# Patient Record
Sex: FEMALE | NOT HISPANIC OR LATINO | URBAN - METROPOLITAN AREA
[De-identification: names, ages, dates, MRNs, and addresses within clinical notes are randomized per-mention and may not be internally consistent; named-entity substitution may affect disease eponyms.]

---

## 2023-07-24 DIAGNOSIS — Z00.00 ENCOUNTER FOR GENERAL ADULT MEDICAL EXAMINATION W/OUT ABNORMAL FINDINGS: ICD-10-CM

## 2023-07-25 NOTE — CONSULT LETTER
[Dear  ___] : Dear  [unfilled], [Consult Letter:] : I had the pleasure of evaluating your patient, [unfilled]. [Please see my note below.] : Please see my note below. [Consult Closing:] : Thank you very much for allowing me to participate in the care of this patient.  If you have any questions, please do not hesitate to contact me. [Sincerely,] : Sincerely, [FreeTextEntry3] : Dr. Esperanza Camargo  [DrRj  ___] : Dr. SMITH

## 2023-07-25 NOTE — RESULTS/DATA
[FreeTextEntry1] : 7/21/23 MRI Abd with MRCP: Findings most consistent with mass forming cholangiocarcinoma and satellite metastases. Periportal lymphadenopathy is concerning for lymph node metastases. \par \par 7/18/23 Abd US: Hepatomegaly and moderately large liver lesions possibly representing hemangiomas (largest 4.9 x 4.6 x 3.9 cm) ; request pre and postcontrast enhanced abdominal MRI for further characterization. Cholecystectomy with compensatory common duct ectasia.

## 2023-07-26 ENCOUNTER — OUTPATIENT (OUTPATIENT)
Dept: OUTPATIENT SERVICES | Facility: HOSPITAL | Age: 63
LOS: 1 days | Discharge: HOME | End: 2023-07-26

## 2023-07-26 ENCOUNTER — APPOINTMENT (OUTPATIENT)
Dept: SURGICAL ONCOLOGY | Facility: CLINIC | Age: 63
End: 2023-07-26
Payer: COMMERCIAL

## 2023-07-26 VITALS
SYSTOLIC BLOOD PRESSURE: 152 MMHG | HEART RATE: 71 BPM | TEMPERATURE: 98.4 F | WEIGHT: 168 LBS | DIASTOLIC BLOOD PRESSURE: 78 MMHG | OXYGEN SATURATION: 95 % | BODY MASS INDEX: 29.4 KG/M2 | HEIGHT: 63.5 IN

## 2023-07-26 DIAGNOSIS — Z87.891 PERSONAL HISTORY OF NICOTINE DEPENDENCE: ICD-10-CM

## 2023-07-26 DIAGNOSIS — C22.1 INTRAHEPATIC BILE DUCT CARCINOMA: ICD-10-CM

## 2023-07-26 DIAGNOSIS — Z78.9 OTHER SPECIFIED HEALTH STATUS: ICD-10-CM

## 2023-07-26 PROCEDURE — 99204 OFFICE O/P NEW MOD 45 MIN: CPT

## 2023-07-27 ENCOUNTER — RESULT REVIEW (OUTPATIENT)
Age: 63
End: 2023-07-27

## 2023-07-27 ENCOUNTER — NON-APPOINTMENT (OUTPATIENT)
Age: 63
End: 2023-07-27

## 2023-07-27 ENCOUNTER — APPOINTMENT (OUTPATIENT)
Dept: HEMATOLOGY ONCOLOGY | Facility: CLINIC | Age: 63
End: 2023-07-27
Payer: COMMERCIAL

## 2023-07-27 ENCOUNTER — OUTPATIENT (OUTPATIENT)
Dept: OUTPATIENT SERVICES | Facility: HOSPITAL | Age: 63
LOS: 1 days | Discharge: HOME | End: 2023-07-27

## 2023-07-27 VITALS
SYSTOLIC BLOOD PRESSURE: 133 MMHG | WEIGHT: 168 LBS | RESPIRATION RATE: 17 BRPM | DIASTOLIC BLOOD PRESSURE: 63 MMHG | HEART RATE: 75 BPM | TEMPERATURE: 98.1 F | BODY MASS INDEX: 29.29 KG/M2 | OXYGEN SATURATION: 98 %

## 2023-07-27 DIAGNOSIS — E03.9 HYPOTHYROIDISM, UNSPECIFIED: ICD-10-CM

## 2023-07-27 DIAGNOSIS — C22.1 INTRAHEPATIC BILE DUCT CARCINOMA: ICD-10-CM

## 2023-07-27 DIAGNOSIS — C77.9 INTRAHEPATIC BILE DUCT CARCINOMA: ICD-10-CM

## 2023-07-27 DIAGNOSIS — G35 MULTIPLE SCLEROSIS: ICD-10-CM

## 2023-07-27 LAB
AFP-TM SERPL-MCNC: 116 NG/ML — HIGH
ALBUMIN SERPL ELPH-MCNC: 4.2 G/DL — SIGNIFICANT CHANGE UP (ref 3.3–5)
ALP SERPL-CCNC: 195 U/L — HIGH (ref 40–120)
ALT FLD-CCNC: 44 U/L — SIGNIFICANT CHANGE UP (ref 10–45)
ANION GAP SERPL CALC-SCNC: 14 MMOL/L — SIGNIFICANT CHANGE UP (ref 5–17)
AST SERPL-CCNC: 61 U/L — HIGH (ref 10–40)
BASOPHILS # BLD AUTO: 0.03 K/UL — SIGNIFICANT CHANGE UP (ref 0–0.2)
BASOPHILS NFR BLD AUTO: 0.4 % — SIGNIFICANT CHANGE UP (ref 0–2)
BILIRUB SERPL-MCNC: 0.3 MG/DL — SIGNIFICANT CHANGE UP (ref 0.2–1.2)
BUN SERPL-MCNC: 13 MG/DL — SIGNIFICANT CHANGE UP (ref 7–23)
CALCIUM SERPL-MCNC: 9.6 MG/DL — SIGNIFICANT CHANGE UP (ref 8.4–10.5)
CANCER AG19-9 SERPL-ACNC: <2 U/ML — SIGNIFICANT CHANGE UP
CEA SERPL-MCNC: 4.6 NG/ML — HIGH (ref 0–3.8)
CHLORIDE SERPL-SCNC: 101 MMOL/L — SIGNIFICANT CHANGE UP (ref 96–108)
CO2 SERPL-SCNC: 26 MMOL/L — SIGNIFICANT CHANGE UP (ref 22–31)
CREAT SERPL-MCNC: 0.58 MG/DL — SIGNIFICANT CHANGE UP (ref 0.5–1.3)
EGFR: 103 ML/MIN/1.73M2 — SIGNIFICANT CHANGE UP
EOSINOPHIL # BLD AUTO: 0.05 K/UL — SIGNIFICANT CHANGE UP (ref 0–0.5)
EOSINOPHIL NFR BLD AUTO: 0.6 % — SIGNIFICANT CHANGE UP (ref 0–6)
GLUCOSE SERPL-MCNC: 111 MG/DL — HIGH (ref 70–99)
HAV IGM SER-ACNC: SIGNIFICANT CHANGE UP
HBV CORE IGM SER-ACNC: SIGNIFICANT CHANGE UP
HBV SURFACE AG SER-ACNC: SIGNIFICANT CHANGE UP
HCT VFR BLD CALC: 32.8 % — LOW (ref 34.5–45)
HCV AB S/CO SERPL IA: 0.06 S/CO — SIGNIFICANT CHANGE UP (ref 0–0.99)
HCV AB SERPL-IMP: SIGNIFICANT CHANGE UP
HGB BLD-MCNC: 10.4 G/DL — LOW (ref 11.5–15.5)
IMM GRANULOCYTES NFR BLD AUTO: 0.4 % — SIGNIFICANT CHANGE UP (ref 0–0.9)
INR BLD: 1.03 RATIO — SIGNIFICANT CHANGE UP (ref 0.85–1.18)
LYMPHOCYTES # BLD AUTO: 1.46 K/UL — SIGNIFICANT CHANGE UP (ref 1–3.3)
LYMPHOCYTES # BLD AUTO: 17.6 % — SIGNIFICANT CHANGE UP (ref 13–44)
MAGNESIUM SERPL-MCNC: 2.1 MG/DL — SIGNIFICANT CHANGE UP (ref 1.6–2.6)
MCHC RBC-ENTMCNC: 26.6 PG — LOW (ref 27–34)
MCHC RBC-ENTMCNC: 31.7 GM/DL — LOW (ref 32–36)
MCV RBC AUTO: 83.9 FL — SIGNIFICANT CHANGE UP (ref 80–100)
MONOCYTES # BLD AUTO: 0.7 K/UL — SIGNIFICANT CHANGE UP (ref 0–0.9)
MONOCYTES NFR BLD AUTO: 8.5 % — SIGNIFICANT CHANGE UP (ref 2–14)
NEUTROPHILS # BLD AUTO: 6.01 K/UL — SIGNIFICANT CHANGE UP (ref 1.8–7.4)
NEUTROPHILS NFR BLD AUTO: 72.5 % — SIGNIFICANT CHANGE UP (ref 43–77)
NRBC # BLD: 0 /100 WBCS — SIGNIFICANT CHANGE UP (ref 0–0)
PLATELET # BLD AUTO: 343 K/UL — SIGNIFICANT CHANGE UP (ref 150–400)
POTASSIUM SERPL-MCNC: 4.6 MMOL/L — SIGNIFICANT CHANGE UP (ref 3.5–5.3)
POTASSIUM SERPL-SCNC: 4.6 MMOL/L — SIGNIFICANT CHANGE UP (ref 3.5–5.3)
PROT SERPL-MCNC: 7.5 G/DL — SIGNIFICANT CHANGE UP (ref 6–8.3)
PROTHROM AB SERPL-ACNC: 11.6 SEC — SIGNIFICANT CHANGE UP (ref 9.5–13)
RBC # BLD: 3.91 M/UL — SIGNIFICANT CHANGE UP (ref 3.8–5.2)
RBC # FLD: 13.2 % — SIGNIFICANT CHANGE UP (ref 10.3–14.5)
SODIUM SERPL-SCNC: 141 MMOL/L — SIGNIFICANT CHANGE UP (ref 135–145)
WBC # BLD: 8.28 K/UL — SIGNIFICANT CHANGE UP (ref 3.8–10.5)
WBC # FLD AUTO: 8.28 K/UL — SIGNIFICANT CHANGE UP (ref 3.8–10.5)

## 2023-07-27 PROCEDURE — G2212 PROLONG OUTPT/OFFICE VIS: CPT

## 2023-07-27 PROCEDURE — 99205 OFFICE O/P NEW HI 60 MIN: CPT

## 2023-07-27 RX ORDER — LEVOTHYROXINE SODIUM 137 UG/1
TABLET ORAL
Refills: 0 | Status: ACTIVE | COMMUNITY

## 2023-07-27 RX ORDER — CARVEDILOL 25 MG/1
25 TABLET, FILM COATED ORAL
Refills: 0 | Status: ACTIVE | COMMUNITY

## 2023-07-27 RX ORDER — OMEPRAZOLE, SODIUM BICARBONATE 40; 1100 MG/1; MG/1
CAPSULE ORAL
Refills: 0 | Status: ACTIVE | COMMUNITY

## 2023-07-27 RX ORDER — INTERFERON BETA-1B 0.3 MG
0.3 KIT SUBCUTANEOUS
Refills: 0 | Status: ACTIVE | COMMUNITY

## 2023-07-27 RX ORDER — PERINDOPRIL ERBUMINE 8 MG/1
8 TABLET ORAL
Refills: 0 | Status: ACTIVE | COMMUNITY

## 2023-07-27 RX ORDER — TRAMADOL HYDROCHLORIDE 50 MG/1
50 TABLET, COATED ORAL
Qty: 12 | Refills: 0 | Status: ACTIVE | COMMUNITY
Start: 2023-07-27

## 2023-07-27 RX ORDER — ATORVASTATIN CALCIUM 80 MG/1
TABLET, FILM COATED ORAL
Refills: 0 | Status: ACTIVE | COMMUNITY

## 2023-07-27 RX ORDER — MODAFINIL 200 MG/1
TABLET ORAL
Refills: 0 | Status: ACTIVE | COMMUNITY

## 2023-08-07 ENCOUNTER — APPOINTMENT (OUTPATIENT)
Dept: NUCLEAR MEDICINE | Facility: CLINIC | Age: 63
End: 2023-08-07

## 2023-08-08 ENCOUNTER — NON-APPOINTMENT (OUTPATIENT)
Age: 63
End: 2023-08-08

## 2023-08-09 NOTE — RESULTS/DATA
[FreeTextEntry1] : US Abd- 07/18/23\par -LIVER: 20/8 cm. Parenchymal Pattern heterogenous. Hyperechoic lesion right lobe 4.2 x 3.6 x 4.3 cm and a mixed lesion  4.9 x 4.6 x 3.9 cm.\par IMPRESSION:\par 1) Hepatomegaly and moderately large liver lesions possibly representing hemangiomas; request pre and postcontrast enhanced abdominal MRI for further characterization.\par 2) Cholecystectomy with compensatory common duct ectasia.\par \par MRI Abd MRCP- 07/18/23\par -LIVER: Normal signal and morphology. Patent hepatic vasculature. There is a mildly T2 hyperintense lowly progressively enhancing lesion centered in segment 8 but also invading into segments 7 and 4 A demonstrating heterogenous restricted diffusion. There is no associated biliary obstruction with lesion, There is an altered perfusion particularly in segment 5.\par Several small satellite metastases are scattered throughout the hepatic lobe with the largest measuring 1.1 x 1.1 cm in segment 5.\par -LYMPH NODES: Multiple prominent and enlarged periportal lymph nodes are present with the largest of which is a 1.3 x 2.2 cm aortocaval lymph node. \par IMPRESSION:\par 1) Findings most consistent with mass formin cholangiocarcinoma and satellite metastases.\par 2) Periportal lymphadenopathy is concerning for lymph node metastases.

## 2023-08-09 NOTE — HISTORY OF PRESENT ILLNESS
[Disease: _____________________] : Disease: [unfilled] [T: ___] : T[unfilled] [N: ___] : N[unfilled] [AJCC Stage: ____] : AJCC Stage: [unfilled] [de-identified] : i [de-identified] : The patient presented with cholangiocarcinoma and satellite metastases in July 2023 at the age of 62. She noted right upper quadrant pain and on 7/18/23.  An abdominal US showed hepatomegaly and moderately large liver lesions possibly representing hemangiomas (largest 4.9 x 4.6 x 3.9 cm). Cholecystectomy with compensatory common duct ectasia. On 7/21/23, an MRI abdomen with MRCP showed findings most consistent with mass forming cholangiocarcinoma and satellite metastases. Periportal lymphadenopathy is concerning for lymph node metastases. \par \par  [de-identified] : PMH: MS on interferon; hypothyroid, HTN, hypercholesterolemia\par FH: maternal GP  50's colon cancer\par SH:\par PSH: [de-identified] : She reports dysguesia c/w silent refulx and fatigue x past few weeks that has progressed to complete exhaustion.  She reports a hx of MS and recent thyroid disease that she attributed these sx to this.  She reports RUQ abdominal pain that became progressive over past month.  Met with her PCP Dr. eNeraj Meyer and had US. PAST MEDICAL HISTORY:  Alcohol abuse     Asthma     DM (diabetes mellitus)     GERD (gastroesophageal reflux disease)     High cholesterol     Hypertension

## 2023-08-09 NOTE — HISTORY OF PRESENT ILLNESS
[Disease: _____________________] : Disease: [unfilled] [T: ___] : T[unfilled] [N: ___] : N[unfilled] [AJCC Stage: ____] : AJCC Stage: [unfilled] [de-identified] : i [de-identified] : The patient presented with cholangiocarcinoma and satellite metastases in July 2023 at the age of 62. She noted right upper quadrant pain and on 7/18/23.  An abdominal US showed hepatomegaly and moderately large liver lesions possibly representing hemangiomas (largest 4.9 x 4.6 x 3.9 cm). Cholecystectomy with compensatory common duct ectasia. On 7/21/23, an MRI abdomen with MRCP showed findings most consistent with mass forming cholangiocarcinoma and satellite metastases. Periportal lymphadenopathy is concerning for lymph node metastases. \par \par  [de-identified] : PMH: MS on interferon; hypothyroid, HTN, hypercholesterolemia\par FH: maternal GP  50's colon cancer\par SH:\par PSH: [de-identified] : She reports dysguesia c/w silent refulx and fatigue x past few weeks that has progressed to complete exhaustion.  She reports a hx of MS and recent thyroid disease that she attributed these sx to this.  She reports RUQ abdominal pain that became progressive over past month.  Met with her PCP Dr. Neeraj Meyer and had US.

## 2023-08-09 NOTE — ASSESSMENT
[FreeTextEntry1] : Presents to the office today for an initial consultation for a newly  diagnosed  cholangiocarcinoma and satellite metastases in July 2023 at the age of 62. She noted right upper quadrant pain and on 7/18/23 an abdominal US showed hepatomegaly and moderately large liver lesions possibly representing hemangiomas (largest 4.9 x 4.6 x 3.9 cm) ; request pre and postcontrast enhanced abdominal MRI for further characterization. Cholecystectomy with compensatory common duct ectasia. On 7/21/23 a MRI abdomen with MRCP showed findings most consistent with mass forming cholangiocarcinoma and satellite metastases. Periportal lymphadenopathy is concerning for lymph node metastases. \par \par

## 2023-08-14 NOTE — ASSESSMENT
[FreeTextEntry1] : Shelly Whelan is a 61 yo F who was referred by Mitch Lopez for a recent diagnosis of  cholangiocarcinoma and satellite metastases in July 2023 at the age of 62. She noted right upper quadrant pain and on 7/18/23 an abdominal US showed hepatomegaly and moderately large liver lesions possibly representing hemangiomas (largest 4.9 x 4.6 x 3.9 cm) ; request pre and postcontrast enhanced abdominal MRI for further characterization. Cholecystectomy with compensatory common duct ectasia. On 7/21/23 a MRI abdomen with MRCP showed findings most consistent with mass forming cholangiocarcinoma and satellite metastases. Periportal lymphadenopathy is concerning for lymph node metastases. Pt established new care with [facility] with Dr Camargo 06/27/23   LABS 07/18/23: ALKK PHOS   172              AST   58             ALT   37  US Abd- 07/18/23 -LIVER: 20/8 cm. Parenchymal Pattern heterogenous. Hyperechoic lesion right lobe 4.2 x 3.6 x 4.3 cm and a mixed lesion  4.9 x 4.6 x 3.9 cm. IMPRESSION: 1) Hepatomegaly and moderately large liver lesions possibly representing hemangiomas; request pre and postcontrast enhanced abdominal MRI for further characterization. 2) Cholecystectomy with compensatory common duct ectasia.  MRI Abd MRCP- 07/18/23 -LIVER: Normal signal and morphology. Patent hepatic vasculature. There is a mildly T2 hyperintense lowly progressively enhancing lesion centered in segment 8 but also invading into segments 7 and 4 A demonstrating heterogenous restricted diffusion. There is no associated biliary obstruction with lesion, There is an altered perfusion particularly in segment 5. Several small satellite metastases are scattered throughout the hepatic lobe with the largest measuring 1.1 x 1.1 cm in segment 5. -LYMPH NODES: Multiple prominent and enlarged periportal lymph nodes are present with the largest of which is a 1.3 x 2.2 cm aortocaval lymph node.  IMPRESSION: 1) Findings most consistent with mass forming cholangiocarcinoma and satellite metastases. 2) Periportal lymphadenopathy is concerning for lymph node metastases.    LAB Ordered for  07/27/23: CA 19-9, CEA, CBC, CMP. HEP Panel,, Mg and PT/INR  Plan: 1) PET/CT 2) Getting other opinions 3) Would pursue chemotherapy

## 2023-08-14 NOTE — HISTORY OF PRESENT ILLNESS
[de-identified] : Shelly Whelan is a 61 yo F who was referred by Mitch Lopez for a recent diagnosis of  cholangiocarcinoma and satellite metastases in July 2023 at the age of 62. She noted right upper quadrant pain and on 7/18/23 an abdominal US showed hepatomegaly and moderately large liver lesions possibly representing hemangiomas (largest 4.9 x 4.6 x 3.9 cm) ; request pre and postcontrast enhanced abdominal MRI for further characterization. Cholecystectomy with compensatory common duct ectasia. On 7/21/23 a MRI abdomen with MRCP showed findings most consistent with mass forming cholangiocarcinoma and satellite metastases. Periportal lymphadenopathy is concerning for lymph node metastases. Pt established new care with [facility] with Dr Camargo 06/27/23   LABS 07/18/23: ALKK PHOS   172              AST   58             ALT   37  US Abd- 07/18/23 -LIVER: 20/8 cm. Parenchymal Pattern heterogenous. Hyperechoic lesion right lobe 4.2 x 3.6 x 4.3 cm and a mixed lesion  4.9 x 4.6 x 3.9 cm. IMPRESSION: 1) Hepatomegaly and moderately large liver lesions possibly representing hemangiomas; request pre and postcontrast enhanced abdominal MRI for further characterization. 2) Cholecystectomy with compensatory common duct ectasia.  MRI Abd MRCP- 07/18/23 -LIVER: Normal signal and morphology. Patent hepatic vasculature. There is a mildly T2 hyperintense lowly progressively enhancing lesion centered in segment 8 but also invading into segments 7 and 4 A demonstrating heterogenous restricted diffusion. There is no associated biliary obstruction with lesion, There is an altered perfusion particularly in segment 5. Several small satellite metastases are scattered throughout the hepatic lobe with the largest measuring 1.1 x 1.1 cm in segment 5. -LYMPH NODES: Multiple prominent and enlarged periportal lymph nodes are present with the largest of which is a 1.3 x 2.2 cm aortocaval lymph node.  IMPRESSION: 1) Findings most consistent with mass formin cholangiocarcinoma and satellite metastases. 2) Periportal lymphadenopathy is concerning for lymph node metastases.